# Patient Record
Sex: FEMALE | Race: WHITE | HISPANIC OR LATINO
[De-identification: names, ages, dates, MRNs, and addresses within clinical notes are randomized per-mention and may not be internally consistent; named-entity substitution may affect disease eponyms.]

---

## 2020-03-11 ENCOUNTER — APPOINTMENT (OUTPATIENT)
Dept: CARDIOLOGY | Facility: CLINIC | Age: 35
End: 2020-03-11
Payer: COMMERCIAL

## 2020-03-11 ENCOUNTER — NON-APPOINTMENT (OUTPATIENT)
Age: 35
End: 2020-03-11

## 2020-03-11 VITALS
SYSTOLIC BLOOD PRESSURE: 122 MMHG | HEART RATE: 60 BPM | OXYGEN SATURATION: 100 % | BODY MASS INDEX: 39.27 KG/M2 | HEIGHT: 60 IN | WEIGHT: 200 LBS | DIASTOLIC BLOOD PRESSURE: 74 MMHG

## 2020-03-11 VITALS — DIASTOLIC BLOOD PRESSURE: 76 MMHG | SYSTOLIC BLOOD PRESSURE: 111 MMHG

## 2020-03-11 DIAGNOSIS — R87.629 UNSPECIFIED ABNORMAL CYTOLOGICAL FINDINGS IN SPECIMENS FROM VAGINA: ICD-10-CM

## 2020-03-11 DIAGNOSIS — Z83.438 FAMILY HISTORY OF OTHER DISORDER OF LIPOPROTEIN METABOLISM AND OTHER LIPIDEMIA: ICD-10-CM

## 2020-03-11 DIAGNOSIS — Z83.49 FAMILY HISTORY OF OTHER ENDOCRINE, NUTRITIONAL AND METABOLIC DISEASES: ICD-10-CM

## 2020-03-11 DIAGNOSIS — Z82.49 FAMILY HISTORY OF ISCHEMIC HEART DISEASE AND OTHER DISEASES OF THE CIRCULATORY SYSTEM: ICD-10-CM

## 2020-03-11 DIAGNOSIS — Z78.9 OTHER SPECIFIED HEALTH STATUS: ICD-10-CM

## 2020-03-11 DIAGNOSIS — Z01.818 ENCOUNTER FOR OTHER PREPROCEDURAL EXAMINATION: ICD-10-CM

## 2020-03-11 PROCEDURE — 99203 OFFICE O/P NEW LOW 30 MIN: CPT

## 2020-03-11 PROCEDURE — 93000 ELECTROCARDIOGRAM COMPLETE: CPT

## 2020-03-11 NOTE — PHYSICAL EXAM
[General Appearance - Well Developed] : well developed [General Appearance - Well Nourished] : well nourished [Normal Conjunctiva] : the conjunctiva exhibited no abnormalities [Normal Oral Mucosa] : normal oral mucosa [Normal Oropharynx] : normal oropharynx [Normal Jugular Venous V Waves Present] : normal jugular venous V waves present [Heart Rate And Rhythm] : heart rate and rhythm were normal [Heart Sounds] : normal S1 and S2 [Murmurs] : no murmurs present [Arterial Pulses Normal] : the arterial pulses were normal [Edema] : no peripheral edema present [Veins - Varicosity Changes] : no varicosital changes were noted in the lower extremities [Respiration, Rhythm And Depth] : normal respiratory rhythm and effort [Exaggerated Use Of Accessory Muscles For Inspiration] : no accessory muscle use [Auscultation Breath Sounds / Voice Sounds] : lungs were clear to auscultation bilaterally [Chest Palpation] : palpation of the chest revealed no abnormalities [Lungs Percussion] : the lungs were normal to percussion [Bowel Sounds] : normal bowel sounds [Abdomen Soft] : soft [Abdomen Tenderness] : non-tender [Abdomen Mass (___ Cm)] : no abdominal mass palpated [Abdomen Hernia] : no hernia was discovered [Abnormal Walk] : normal gait [Nail Clubbing] : no clubbing of the fingernails [Cyanosis, Localized] : no localized cyanosis [Skin Color & Pigmentation] : normal skin color and pigmentation [Skin Turgor] : normal skin turgor [] : no rash [Oriented To Time, Place, And Person] : oriented to person, place, and time [Impaired Insight] : insight and judgment were intact [No Anxiety] : not feeling anxious [FreeTextEntry1] : Protuberant and striae

## 2020-03-11 NOTE — HISTORY OF PRESENT ILLNESS
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 Had an echo during her second pregnancy in 2015 that was WNL. Will undergo cone resection of the uterus after an abnormal Pap smear. Had palpitations in the past, last 15 seconds, but her HR was normal.\par At the present time patient is completely asymptomatic and denies CP, SOB, orthopnea or PND. Denies palpitations, dizziness or ankle swelling. Able to run 1 mile without a problem. Doesn't exercise on a regular basis. \par Had a Holter ECG and Echo were WNL.\par S/P cholecystectomy in 2006 for gallstones\par Doesn't smoke\par Occasional alcohol\par Denies the use of illicit drugs\par No family history of heart disease\par

## 2020-03-11 NOTE — DISCUSSION/SUMMARY
[FreeTextEntry1] : Ms. BONNIE DICKSON is a 34 year female with no history of heart disease. She is at a low cardiac risk and will undergo a low risk procedure (cardiac risk <1%). Therefore, there is no need for further testing prior to the procedure. \par At the present time She is completely asymptomatic.\par I recommended lifestyle modification with weight loss and exercise. Change in diet from high carb to Mediterranean in view of elevated TG levels. \par We have reviewed the results of her routine laboratory tests\par Routine follow up in 6 months if needed. \par

## 2020-09-16 ENCOUNTER — APPOINTMENT (OUTPATIENT)
Dept: CARDIOLOGY | Facility: CLINIC | Age: 35
End: 2020-09-16

## 2020-12-23 PROBLEM — Z01.818 ENCOUNTER FOR PREOPERATIVE EXAMINATION FOR GENERAL SURGICAL PROCEDURE: Status: RESOLVED | Noted: 2020-03-11 | Resolved: 2020-12-23

## 2021-01-30 ENCOUNTER — TRANSCRIPTION ENCOUNTER (OUTPATIENT)
Age: 36
End: 2021-01-30

## 2021-03-06 ENCOUNTER — TRANSCRIPTION ENCOUNTER (OUTPATIENT)
Age: 36
End: 2021-03-06

## 2021-03-19 ENCOUNTER — ASOB RESULT (OUTPATIENT)
Age: 36
End: 2021-03-19

## 2021-03-19 ENCOUNTER — APPOINTMENT (OUTPATIENT)
Dept: MATERNAL FETAL MEDICINE | Facility: CLINIC | Age: 36
End: 2021-03-19
Payer: COMMERCIAL

## 2021-03-19 PROCEDURE — 99202 OFFICE O/P NEW SF 15 MIN: CPT | Mod: 95

## 2021-05-24 ENCOUNTER — NON-APPOINTMENT (OUTPATIENT)
Age: 36
End: 2021-05-24

## 2021-05-25 ENCOUNTER — NON-APPOINTMENT (OUTPATIENT)
Age: 36
End: 2021-05-25

## 2021-05-25 ENCOUNTER — APPOINTMENT (OUTPATIENT)
Dept: CARDIOLOGY | Facility: CLINIC | Age: 36
End: 2021-05-25
Payer: COMMERCIAL

## 2021-05-25 VITALS
TEMPERATURE: 98.1 F | OXYGEN SATURATION: 98 % | HEART RATE: 76 BPM | DIASTOLIC BLOOD PRESSURE: 70 MMHG | BODY MASS INDEX: 47.07 KG/M2 | SYSTOLIC BLOOD PRESSURE: 122 MMHG | WEIGHT: 241 LBS

## 2021-05-25 DIAGNOSIS — Z3A.28 28 WEEKS GESTATION OF PREGNANCY: ICD-10-CM

## 2021-05-25 PROCEDURE — 93000 ELECTROCARDIOGRAM COMPLETE: CPT | Mod: NC

## 2021-05-25 PROCEDURE — 99212 OFFICE O/P EST SF 10 MIN: CPT

## 2021-05-25 PROCEDURE — 99072 ADDL SUPL MATRL&STAF TM PHE: CPT

## 2021-05-25 NOTE — ASSESSMENT
[FreeTextEntry1] : ECG performed today at the office revealed a NSR 75 bpm, with normal AQRS, KS, QRS and QTc.\par

## 2021-05-25 NOTE — REVIEW OF SYSTEMS
[Dyspnea on exertion] : dyspnea during exertion [Lower Ext Edema] : lower extremity edema [Palpitations] : palpitations [Negative] : Heme/Lymph

## 2021-05-25 NOTE — DISCUSSION/SUMMARY
[FreeTextEntry1] : Ms. BONNIE DICKSON is a 35 year female pregnant on her 7th month, again C/O palpitations 113 x ' with mild SOB and no syncope or CP. Probably not an arrhythmia based on the relatively low heart rate. Regardless will request a Holter monitor to assess. \par Routine follow up in 6 months\par

## 2021-05-25 NOTE — PHYSICAL EXAM
[Well Developed] : well developed [Well Nourished] : well nourished [No Acute Distress] : no acute distress [Obese] : obese [Normal Conjunctiva] : normal conjunctiva [Normal Venous Pressure] : normal venous pressure [No Carotid Bruit] : no carotid bruit [Normal S1, S2] : normal S1, S2 [No Murmur] : no murmur [No Rub] : no rub [No Gallop] : no gallop [Clear Lung Fields] : clear lung fields [Good Air Entry] : good air entry [No Respiratory Distress] : no respiratory distress  [Soft] : abdomen soft [Non Tender] : non-tender [No Masses/organomegaly] : no masses/organomegaly [Normal Bowel Sounds] : normal bowel sounds [Normal Gait] : normal gait [No Edema] : no edema [No Cyanosis] : no cyanosis [No Clubbing] : no clubbing [No Varicosities] : no varicosities [No Rash] : no rash [No Skin Lesions] : no skin lesions [Moves all extremities] : moves all extremities [No Focal Deficits] : no focal deficits [Normal Speech] : normal speech [Alert and Oriented] : alert and oriented [Normal memory] : normal memory [de-identified] : Protuberant. Uterus above umbilical line. Striae.

## 2021-05-25 NOTE — HISTORY OF PRESENT ILLNESS
[FreeTextEntry1] : 36 yo woman, , mother of two. 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 Had an echo during her second pregnancy in 2015 that was WNL.\yimi Had a cone resection of the uterus after an abnormal Pap smear in 7/2020. \par Had palpitations in the past, last 15 seconds, but her HR was normal. Had a Holter ECG in 3/2020 that was normal. \par Again has been C/O palpitations that lasted 10 minutes. Pulse oxymeter revealed a heart rate of 113 and slowly resolved to 86 BPM. She felt SOB and O2 sat was 95% room air. Denies CP,  orthopnea or PND. Denies dizziness but has BE ankle swelling. Able to run 1 mile without a problem. Doesn't exercise on a regular basis. \par Had a Echo were WNL.\par S/P cholecystectomy in 2006 for gallstones\par Doesn't smoke\par Occasional alcohol\par Denies the use of illicit drugs\par No family history of heart disease\par Received the COVID 19 vaccine\par

## 2021-08-25 ENCOUNTER — NON-APPOINTMENT (OUTPATIENT)
Age: 36
End: 2021-08-25

## 2021-08-25 ENCOUNTER — APPOINTMENT (OUTPATIENT)
Dept: CARDIOLOGY | Facility: CLINIC | Age: 36
End: 2021-08-25
Payer: COMMERCIAL

## 2021-08-25 VITALS
DIASTOLIC BLOOD PRESSURE: 66 MMHG | BODY MASS INDEX: 42.97 KG/M2 | WEIGHT: 220 LBS | OXYGEN SATURATION: 97 % | TEMPERATURE: 98.5 F | HEART RATE: 80 BPM | SYSTOLIC BLOOD PRESSURE: 101 MMHG

## 2021-08-25 DIAGNOSIS — I10 ESSENTIAL (PRIMARY) HYPERTENSION: ICD-10-CM

## 2021-08-25 PROCEDURE — 99213 OFFICE O/P EST LOW 20 MIN: CPT

## 2021-08-25 PROCEDURE — 93000 ELECTROCARDIOGRAM COMPLETE: CPT

## 2021-08-25 NOTE — HISTORY OF PRESENT ILLNESS
[FreeTextEntry1] : 34 yo woman, , mother of three. Two weeks post partum (8/6/2021). 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 Had an echo during her second pregnancy in 2015 that was WNL. Has developed post partum eclampsia with HTN, ankle edema and headache (also may be related to the epidural). Blood work in the hospital revealed elevated LFTs. No seizures. Baby is healthy. Not breastfeeding.\par Palpitations have resolved.\par Had a cone resection of the uterus after an abnormal Pap smear in 7/2020. \par Had palpitations during the [id number] and was evaluated for that, brief, 15 seconds, but her HR was normal. Had a Holter ECG in 3/2020 that was normal.  She felt SOB and O2 sat was 95% room air. \par At the present time patient is completely asymptomatic and denies CP, SOB, orthopnea or PND. Denies palpitations, dizziness or ankle swelling.\par Able to run 1 mile without a problem. Doesn't exercise on a regular basis. \par Had a Echo were WNL.\par S/P cholecystectomy in 2006 for gallstones\par Doesn't smoke\par Occasional alcohol\par Denies the use of illicit drugs\par No family history of heart disease\par Received the COVID 19 vaccine\par

## 2021-08-25 NOTE — DISCUSSION/SUMMARY
[FreeTextEntry1] : Ms. BONNIE DICKSON is a 35 year female, 3 weeks post paryum, developed post partum eclampsia with HTN, ankle edema and elevated LFTs. All in the process of resolution.\par Blood pressure was managed with labetalol and nifedipine with good response. Now BP is on the low side and she is symptomatic (dizziness). Will stop nifedipine and will continue to evaluate and possible D/C labetalol if BP well controlled.\par Asymptomatic.\par Will repeat blood work\par Routine follow up in 3 months\par

## 2021-08-25 NOTE — ASSESSMENT
[FreeTextEntry1] : ECG performed today at the office revealed a NSR 83 bpm, with normal AQRS, UT, QRS and QTc.\par

## 2021-08-25 NOTE — PHYSICAL EXAM
[Well Developed] : well developed [Well Nourished] : well nourished [No Acute Distress] : no acute distress [Obese] : obese [Normal Conjunctiva] : normal conjunctiva [Normal Venous Pressure] : normal venous pressure [No Carotid Bruit] : no carotid bruit [Normal S1, S2] : normal S1, S2 [No Murmur] : no murmur [No Rub] : no rub [No Gallop] : no gallop [Clear Lung Fields] : clear lung fields [Good Air Entry] : good air entry [No Respiratory Distress] : no respiratory distress  [Non Tender] : non-tender [Soft] : abdomen soft [Normal Bowel Sounds] : normal bowel sounds [No Masses/organomegaly] : no masses/organomegaly [Normal Gait] : normal gait [No Edema] : no edema [No Cyanosis] : no cyanosis [No Clubbing] : no clubbing [No Varicosities] : no varicosities [No Rash] : no rash [No Skin Lesions] : no skin lesions [Moves all extremities] : moves all extremities [No Focal Deficits] : no focal deficits [Normal Speech] : normal speech [Alert and Oriented] : alert and oriented [Normal memory] : normal memory [de-identified] : Striae

## 2021-09-08 ENCOUNTER — NON-APPOINTMENT (OUTPATIENT)
Age: 36
End: 2021-09-08

## 2021-09-10 ENCOUNTER — NON-APPOINTMENT (OUTPATIENT)
Age: 36
End: 2021-09-10

## 2021-09-22 ENCOUNTER — NON-APPOINTMENT (OUTPATIENT)
Age: 36
End: 2021-09-22

## 2021-09-27 ENCOUNTER — RX CHANGE (OUTPATIENT)
Age: 36
End: 2021-09-27

## 2021-09-30 ENCOUNTER — RX CHANGE (OUTPATIENT)
Age: 36
End: 2021-09-30

## 2021-10-04 ENCOUNTER — RX CHANGE (OUTPATIENT)
Age: 36
End: 2021-10-04

## 2021-10-05 ENCOUNTER — RX CHANGE (OUTPATIENT)
Age: 36
End: 2021-10-05

## 2021-11-23 RX ORDER — NIFEDIPINE 30 MG/1
30 TABLET, EXTENDED RELEASE ORAL DAILY
Qty: 90 | Refills: 1 | Status: DISCONTINUED | COMMUNITY
Start: 2021-08-25 | End: 2021-11-23

## 2021-11-24 ENCOUNTER — NON-APPOINTMENT (OUTPATIENT)
Age: 36
End: 2021-11-24

## 2021-11-24 ENCOUNTER — APPOINTMENT (OUTPATIENT)
Dept: CARDIOLOGY | Facility: CLINIC | Age: 36
End: 2021-11-24
Payer: COMMERCIAL

## 2021-11-24 VITALS
BODY MASS INDEX: 46.33 KG/M2 | SYSTOLIC BLOOD PRESSURE: 110 MMHG | OXYGEN SATURATION: 97 % | TEMPERATURE: 98.3 F | DIASTOLIC BLOOD PRESSURE: 73 MMHG | HEIGHT: 60 IN | WEIGHT: 236 LBS | HEART RATE: 83 BPM

## 2021-11-24 DIAGNOSIS — R00.2 PALPITATIONS: ICD-10-CM

## 2021-11-24 DIAGNOSIS — E66.9 OBESITY, UNSPECIFIED: ICD-10-CM

## 2021-11-24 PROCEDURE — 99213 OFFICE O/P EST LOW 20 MIN: CPT

## 2021-11-24 PROCEDURE — 93000 ELECTROCARDIOGRAM COMPLETE: CPT

## 2021-11-24 RX ORDER — LABETALOL HYDROCHLORIDE 200 MG/1
200 TABLET, FILM COATED ORAL TWICE DAILY
Qty: 120 | Refills: 5 | Status: DISCONTINUED | COMMUNITY
Start: 2021-08-25 | End: 2021-11-24

## 2021-11-24 RX ORDER — PNV NO.95/FERROUS FUM/FOLIC AC 28MG-0.8MG
27-0.8 TABLET ORAL DAILY
Qty: 90 | Refills: 3 | Status: DISCONTINUED | COMMUNITY
Start: 2021-05-25 | End: 2021-11-24

## 2021-11-24 NOTE — HISTORY OF PRESENT ILLNESS
[FreeTextEntry1] : 37 yo woman, , mother of three. Two weeks post partum (8/6/2021). 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 Had an echo during her second pregnancy in 2015 that was WNL. Has developed post partum eclampsia with HTN, ankle edema and headache (also may be related to the epidural). Blood work in the hospital revealed elevated LFTs. No seizures. Baby is healthy. Not breastfeeding.\par Palpitations have resolved.\par Had a cone resection of the uterus after an abnormal Pap smear in 7/2020. \par Had palpitations during the pregnancy and was evaluated for that, brief, 15 seconds, but her HR was normal. Had a Holter ECG in 3/2020 that was normal.  She felt SOB and O2 sat was 95% room air. \par At the present time patient is completely asymptomatic and denies CP, SOB, orthopnea or PND. Denies palpitations, dizziness or ankle swelling. All medications were stopped. \par Has not returned to work. Will return Jan 2022\par Able to run 1 mile without a problem. Doesn't exercise on a regular basis. \par Had a Echo were WNL.\par S/P cholecystectomy in 2006 for gallstones\par Doesn't smoke\par Occasional alcohol\par Denies the use of illicit drugs\par No family history of heart disease\par Received the COVID 19 vaccine\par

## 2021-11-24 NOTE — PHYSICAL EXAM
[Well Developed] : well developed [Well Nourished] : well nourished [No Acute Distress] : no acute distress [Obese] : obese [Normal Conjunctiva] : normal conjunctiva [Normal Venous Pressure] : normal venous pressure [No Carotid Bruit] : no carotid bruit [Normal S1, S2] : normal S1, S2 [No Murmur] : no murmur [No Rub] : no rub [No Gallop] : no gallop [Clear Lung Fields] : clear lung fields [Good Air Entry] : good air entry [No Respiratory Distress] : no respiratory distress  [Soft] : abdomen soft [Non Tender] : non-tender [No Masses/organomegaly] : no masses/organomegaly [Normal Bowel Sounds] : normal bowel sounds [Normal Gait] : normal gait [No Edema] : no edema [No Cyanosis] : no cyanosis [No Clubbing] : no clubbing [No Varicosities] : no varicosities [No Rash] : no rash [No Skin Lesions] : no skin lesions [Moves all extremities] : moves all extremities [No Focal Deficits] : no focal deficits [Normal Speech] : normal speech [Alert and Oriented] : alert and oriented [Normal memory] : normal memory [de-identified] : Striae

## 2021-11-24 NOTE — DISCUSSION/SUMMARY
[FreeTextEntry1] : Ms. BONNIE DICKSON is a 36 year female, developed post partum eclampsia with HTN, ankle edema and elevated LFTs. All resolved. We stopped all her medications and blood pressure has been normal.\par Asymptomatic.\par She will follow with her PCP\par Will return in BP above normal.\par

## 2021-11-24 NOTE — ASSESSMENT
[FreeTextEntry1] : ECG performed today at the office revealed a NSR 68 bpm, with normal AQRS, OR, QRS and QTc.\par

## 2024-04-05 ENCOUNTER — NON-APPOINTMENT (OUTPATIENT)
Age: 39
End: 2024-04-05